# Patient Record
Sex: MALE | Race: OTHER | Employment: OTHER | URBAN - METROPOLITAN AREA
[De-identification: names, ages, dates, MRNs, and addresses within clinical notes are randomized per-mention and may not be internally consistent; named-entity substitution may affect disease eponyms.]

---

## 2023-01-04 ENCOUNTER — HOSPITAL ENCOUNTER (EMERGENCY)
Age: 18
Discharge: HOME | End: 2023-01-04
Attending: EMERGENCY MEDICINE
Payer: OTHER GOVERNMENT

## 2023-01-04 VITALS
RESPIRATION RATE: 18 BRPM | OXYGEN SATURATION: 100 % | BODY MASS INDEX: 24.48 KG/M2 | SYSTOLIC BLOOD PRESSURE: 133 MMHG | HEART RATE: 99 BPM | DIASTOLIC BLOOD PRESSURE: 62 MMHG | WEIGHT: 171 LBS | TEMPERATURE: 98.2 F | HEIGHT: 70 IN

## 2023-01-04 DIAGNOSIS — L05.91 PILONIDAL CYST: Primary | ICD-10-CM

## 2023-01-04 PROCEDURE — 99283 EMERGENCY DEPT VISIT LOW MDM: CPT

## 2023-01-04 RX ORDER — DOXYCYCLINE HYCLATE 100 MG
100 TABLET ORAL 2 TIMES DAILY
Qty: 20 TABLET | Refills: 0 | Status: SHIPPED | OUTPATIENT
Start: 2023-01-04 | End: 2023-01-14

## 2023-01-04 NOTE — ED PROVIDER NOTES
[facility] EMERGENCY DEPT  EMERGENCY DEPARTMENT ENCOUNTER       Pt Name: Jerica Kim  MRN: [id number]  [place] 2005  Date of evaluation: 1/4/2023  Provider: Glenroy Moralez PA-C   PCP: Opal Delgadillo MD  Note Started: 10:24 AM 1/4/23     CHIEF COMPLAINT       Chief Complaint   Patient presents with    Tailbone Pain        HISTORY OF PRESENT ILLNESS: 1 or more elements      History From: Patient  HPI Limitations : None     Jerica Kim is a 21 y.o. male,active duty, who presents c/o pain to tailbone. Pt notes tender lesion to that area x 2 weeks. He notes it drained blood and pus. Pt notes area was more irritated after running in PT today. Pt has not tried any tx. He denies hx of pilonidal. Pt is not DM. Pt denies fever, chills, vomiting, immunosuppression. Nursing Notes were all reviewed and agreed with or any disagreements were addressed in the HPI. REVIEW OF SYSTEMS      Review of Systems     Positives and Pertinent negatives as per HPI. PAST HISTORY     Past Medical History:  History reviewed. No pertinent past medical history. Past Surgical History:  History reviewed. No pertinent surgical history. Family History:  History reviewed. No pertinent family history. Social History:  Social History     Tobacco Use    Smoking status: Never    Smokeless tobacco: Never   Substance Use Topics    Alcohol use: Never    Drug use: Never       Allergies:  No Known Allergies    CURRENT MEDICATIONS      Previous Medications    No medications on file       SCREENINGS               No data recorded         PHYSICAL EXAM      ED Triage Vitals [01/04/23 0947]   ED Encounter Vitals Group      /62      Pulse (Heart Rate) 99      Resp Rate 18      Temp 98.2 °F (36.8 °C)      Temp src       O2 Sat (%) 100 %      Weight 171 lb      Height 5' 10\"        Physical Exam  Vitals and nursing note reviewed. Constitutional:       General: He is not in acute distress. Appearance: He is well-developed.  He is not diaphoretic. Comments:  male in NAD. Alert. Appears comfortable. In fast track   HENT:      Head: Normocephalic and atraumatic. Right Ear: External ear normal. Tympanic membrane is not bulging. Left Ear: External ear normal.      Nose: Nose normal.   Eyes:      General: No scleral icterus. Right eye: No discharge. Left eye: No discharge. Conjunctiva/sclera: Conjunctivae normal.   Cardiovascular:      Rate and Rhythm: Normal rate and regular rhythm. Heart sounds: Normal heart sounds. No murmur heard. No friction rub. No gallop. Pulmonary:      Effort: Pulmonary effort is normal. No tachypnea or accessory muscle usage. Breath sounds: No decreased breath sounds. Musculoskeletal:         General: Normal range of motion. Cervical back: Normal range of motion. Skin:     General: Skin is warm and dry. Comments: Drained cyst to gluteal cleft. No induration or fluctuance. Trace pus expressed. Neurological:      Mental Status: He is alert and oriented to person, place, and time. Psychiatric:         Judgment: Judgment normal.        DIAGNOSTIC RESULTS   LABS:     No results found for this or any previous visit (from the past 12 hour(s)). EKG: When ordered, EKG's are interpreted by the Emergency Department Physician in the absence of a cardiologist.  Please see their note for interpretation of EKG. RADIOLOGY:  Non-plain film images such as CT, Ultrasound and MRI are read by the radiologist. Plain radiographic images are visualized and preliminarily interpreted by the ED Provider with the below findings:        Interpretation per the Radiologist below, if available at the time of this note:     No results found.       PROCEDURES   Unless otherwise noted below, none  Procedures     CRITICAL CARE TIME       EMERGENCY DEPARTMENT COURSE and DIFFERENTIAL DIAGNOSIS/MDM   Vitals:    Vitals:    01/04/23 0947   BP: 133/62   Pulse: 99   Resp: 18 Temp: 98.2 °F (36.8 °C)   SpO2: 100%   Weight: 77.6 kg (171 lb)   Height: 5' 10\" (1.778 m)        Patient was given the following medications:  Medications - No data to display       Disposition Considerations (Tests not done, Shared Decision Making, Pt Expectation of Test or Tx.): pilonidal cyst, abscess, folliculitis    Pt with c/o pilonidal abscess x 2 weeks. Drained spontaneously. Will Rx ABX and instruct warm compresses or SITZ baths. No evidence of deep or systemic infectious process. Pt is not DM and is immunocompetent.  PCM FU. Reasons to RTED discussed with pt. All questions answered. Pt feels comfortable going home at this time. Pt expressed understanding and he agrees with plan. FINAL IMPRESSION     1. Pilonidal cyst          DISPOSITION/PLAN   Ernesto Bergs  results have been reviewed with him. He has been counseled regarding his diagnosis, treatment, and plan. He verbally conveys understanding and agreement of the signs, symptoms, diagnosis, treatment and prognosis and additionally agrees to follow up as discussed. He also agrees with the care-plan and conveys that all of his questions have been answered. I have also provided discharge instructions for him that include: educational information regarding their diagnosis and treatment, and list of reasons why they would want to return to the ED prior to their follow-up appointment, should his condition change. Labs Reviewed - No data to display     No results found for this or any previous visit (from the past 12 hour(s)). No orders to display        CLINICAL IMPRESSION    1. Pilonidal cyst        Discharge Note: The patient is stable for discharge home. The signs, symptoms, diagnosis, and discharge instructions have been discussed, understanding conveyed, and agreed upon. The patient is to follow up as recommended or return to ER should their symptoms worsen.       PATIENT REFERRED TO:  Follow-up Information       Follow up With Specialties Details Why Contact Neo GONZALEZ    [phone]    [facility] EMERGENCY DEPT Emergency Medicine   [address] [id number] [phone]              DISCHARGE MEDICATIONS:  Current Discharge Medication List        START taking these medications    Details   doxycycline (VIBRA-TABS) 100 mg tablet Take 1 Tablet by mouth two (2) times a day for 10 days. Indications: an infection of the skin and the tissue below the skin  Qty: 20 Tablet, Refills: 0  Start date: 1/4/2023, End date: 1/14/2023               DISCONTINUED MEDICATIONS:  Current Discharge Medication List          Shared Not Shared RICARDO: I have seen and evaluated the patient. My supervision physician was available for consultation. I am the Primary Clinician of Record. Melinda Arredondo PA-C (electronically signed)    (Please note that parts of this dictation were completed with voice recognition software. Quite often unanticipated grammatical, syntax, homophones, and other interpretive errors are inadvertently transcribed by the computer software. Please disregards these errors.  Please excuse any errors that have escaped final proofreading.)

## 2023-01-04 NOTE — LETTER
[facility] [place]  [facility] EMERGENCY DEPT  [address]  [facility] [id number] [phone]    Work/School Note    Date: 1/4/2023    To Whom It May concern:    Shobha Villareal was seen and treated today in the emergency room by the following provider(s):  Attending Provider: Sandra Phillip MD  Physician Assistant: Mirta Easton PA-C. Shobha Villareal should be excused from physical training until cleared by  PCM or [facility].      Sincerely,          PEE MendozaC

## 2023-01-04 NOTE — ED TRIAGE NOTES
States \" I have something going on down below like on my tailbone cause I am having bleeding and pain to that area x 1 month but not getting any better. \"